# Patient Record
Sex: FEMALE | Race: WHITE
[De-identification: names, ages, dates, MRNs, and addresses within clinical notes are randomized per-mention and may not be internally consistent; named-entity substitution may affect disease eponyms.]

---

## 2018-02-13 ENCOUNTER — HOSPITAL ENCOUNTER (OUTPATIENT)
Dept: HOSPITAL 95 - LAB | Age: 63
End: 2018-02-13
Payer: MEDICARE

## 2018-02-13 DIAGNOSIS — F19.10: Primary | ICD-10-CM

## 2018-02-13 PROCEDURE — G0480 DRUG TEST DEF 1-7 CLASSES: HCPCS

## 2018-02-14 LAB
Lab: 16 NG/ML
NORBUPRENORPHINE UR QL CFM: 5 NG/ML

## 2018-03-02 ENCOUNTER — HOSPITAL ENCOUNTER (OUTPATIENT)
Dept: HOSPITAL 95 - ER | Age: 63
Setting detail: OBSERVATION
LOS: 3 days | Discharge: HOME | End: 2018-03-05
Attending: HOSPITALIST | Admitting: HOSPITALIST
Payer: MEDICARE

## 2018-03-02 VITALS — HEIGHT: 65.98 IN | WEIGHT: 151.9 LBS | BODY MASS INDEX: 24.41 KG/M2

## 2018-03-02 DIAGNOSIS — R40.0: ICD-10-CM

## 2018-03-02 DIAGNOSIS — F19.11: ICD-10-CM

## 2018-03-02 DIAGNOSIS — I48.92: ICD-10-CM

## 2018-03-02 DIAGNOSIS — G93.89: ICD-10-CM

## 2018-03-02 DIAGNOSIS — F32.9: ICD-10-CM

## 2018-03-02 DIAGNOSIS — Z87.891: ICD-10-CM

## 2018-03-02 DIAGNOSIS — G40.909: ICD-10-CM

## 2018-03-02 DIAGNOSIS — G62.9: ICD-10-CM

## 2018-03-02 DIAGNOSIS — I48.91: Primary | ICD-10-CM

## 2018-03-02 DIAGNOSIS — B19.20: ICD-10-CM

## 2018-03-02 DIAGNOSIS — F41.9: ICD-10-CM

## 2018-03-02 DIAGNOSIS — Z90.710: ICD-10-CM

## 2018-03-02 DIAGNOSIS — Z98.890: ICD-10-CM

## 2018-03-02 DIAGNOSIS — Z79.899: ICD-10-CM

## 2018-03-02 DIAGNOSIS — Z90.89: ICD-10-CM

## 2018-03-02 DIAGNOSIS — J44.9: ICD-10-CM

## 2018-03-02 LAB
ALBUMIN SERPL BCP-MCNC: 3.4 G/DL (ref 3.4–5)
ALBUMIN/GLOB SERPL: 0.8 {RATIO} (ref 0.8–1.8)
ALT SERPL W P-5'-P-CCNC: 40 U/L (ref 12–78)
ANION GAP SERPL CALCULATED.4IONS-SCNC: 6 MMOL/L (ref 6–16)
AST SERPL W P-5'-P-CCNC: 24 U/L (ref 12–37)
BASOPHILS # BLD AUTO: 0.04 K/MM3 (ref 0–0.23)
BASOPHILS NFR BLD AUTO: 1 % (ref 0–2)
BILIRUB SERPL-MCNC: 0.1 MG/DL (ref 0.1–1)
BUN SERPL-MCNC: 21 MG/DL (ref 8–24)
CALCIUM SERPL-MCNC: 8.8 MG/DL (ref 8.5–10.1)
CANNABINOIDS UR QL: DETECTED
CHLORIDE SERPL-SCNC: 105 MMOL/L (ref 98–108)
CO2 SERPL-SCNC: 28 MMOL/L (ref 21–32)
CREAT SERPL-MCNC: 0.62 MG/DL (ref 0.4–1)
DEPRECATED RDW RBC AUTO: 42.2 FL (ref 35.1–46.3)
EOSINOPHIL # BLD AUTO: 0.14 K/MM3 (ref 0–0.68)
EOSINOPHIL NFR BLD AUTO: 2 % (ref 0–6)
ERYTHROCYTE [DISTWIDTH] IN BLOOD BY AUTOMATED COUNT: 13.6 % (ref 11.7–14.2)
GLOBULIN SER CALC-MCNC: 4.5 G/DL (ref 2.2–4)
GLUCOSE SERPL-MCNC: 93 MG/DL (ref 70–99)
HCT VFR BLD AUTO: 45.3 % (ref 33–51)
HGB BLD-MCNC: 14.8 G/DL (ref 11.5–16)
IMM GRANULOCYTES # BLD AUTO: 0.03 K/MM3 (ref 0–0.1)
IMM GRANULOCYTES NFR BLD AUTO: 0 % (ref 0–1)
LYMPHOCYTES # BLD AUTO: 2.17 K/MM3 (ref 0.84–5.2)
LYMPHOCYTES NFR BLD AUTO: 26 % (ref 21–46)
MAGNESIUM SERPL-MCNC: 2.3 MG/DL (ref 1.6–2.4)
MCHC RBC AUTO-ENTMCNC: 32.7 G/DL (ref 31.5–36.5)
MCV RBC AUTO: 85 FL (ref 80–100)
MONOCYTES # BLD AUTO: 0.74 K/MM3 (ref 0.16–1.47)
MONOCYTES NFR BLD AUTO: 9 % (ref 4–13)
NEUTROPHILS # BLD AUTO: 5.16 K/MM3 (ref 1.96–9.15)
NEUTROPHILS NFR BLD AUTO: 62 % (ref 41–73)
NRBC # BLD AUTO: 0 K/MM3 (ref 0–0.02)
NRBC BLD AUTO-RTO: 0 /100 WBC (ref 0–0.2)
PLATELET # BLD AUTO: 333 K/MM3 (ref 150–400)
POTASSIUM SERPL-SCNC: 4.4 MMOL/L (ref 3.5–5.5)
PROT SERPL-MCNC: 7.9 G/DL (ref 6.4–8.2)
SODIUM SERPL-SCNC: 139 MMOL/L (ref 136–145)
TROPONIN I SERPL-MCNC: <0.015 NG/ML (ref 0–0.04)
TSH SERPL DL<=0.005 MIU/L-ACNC: 2.07 UIU/ML (ref 0.36–4.8)

## 2018-03-02 PROCEDURE — G0378 HOSPITAL OBSERVATION PER HR: HCPCS

## 2018-03-03 LAB
ALBUMIN SERPL BCP-MCNC: 3.1 G/DL (ref 3.4–5)
ALBUMIN/GLOB SERPL: 0.8 {RATIO} (ref 0.8–1.8)
ALT SERPL W P-5'-P-CCNC: 36 U/L (ref 12–78)
ANION GAP SERPL CALCULATED.4IONS-SCNC: 6 MMOL/L (ref 6–16)
AST SERPL W P-5'-P-CCNC: 21 U/L (ref 12–37)
BASOPHILS # BLD AUTO: 0.04 K/MM3 (ref 0–0.23)
BASOPHILS NFR BLD AUTO: 1 % (ref 0–2)
BILIRUB SERPL-MCNC: 0.2 MG/DL (ref 0.1–1)
BUN SERPL-MCNC: 26 MG/DL (ref 8–24)
CALCIUM SERPL-MCNC: 8.3 MG/DL (ref 8.5–10.1)
CHLORIDE SERPL-SCNC: 105 MMOL/L (ref 98–108)
CHOLEST SERPL-MCNC: 236 MG/DL (ref 50–200)
CHOLEST/HDLC SERPL: 3.7 {RATIO}
CO2 SERPL-SCNC: 29 MMOL/L (ref 21–32)
CREAT SERPL-MCNC: 0.64 MG/DL (ref 0.4–1)
DEPRECATED RDW RBC AUTO: 43.1 FL (ref 35.1–46.3)
EOSINOPHIL # BLD AUTO: 0.12 K/MM3 (ref 0–0.68)
EOSINOPHIL NFR BLD AUTO: 2 % (ref 0–6)
ERYTHROCYTE [DISTWIDTH] IN BLOOD BY AUTOMATED COUNT: 13.6 % (ref 11.7–14.2)
GLOBULIN SER CALC-MCNC: 4 G/DL (ref 2.2–4)
GLUCOSE SERPL-MCNC: 97 MG/DL (ref 70–99)
HCT VFR BLD AUTO: 39.7 % (ref 33–51)
HDLC SERPL-MCNC: 64 MG/DL (ref 39–?)
HGB BLD-MCNC: 12.8 G/DL (ref 11.5–16)
IMM GRANULOCYTES # BLD AUTO: 0.03 K/MM3 (ref 0–0.1)
IMM GRANULOCYTES NFR BLD AUTO: 0 % (ref 0–1)
LDLC SERPL CALC-MCNC: 129 MG/DL (ref 0–110)
LDLC/HDLC SERPL: 2 {RATIO}
LYMPHOCYTES # BLD AUTO: 2.24 K/MM3 (ref 0.84–5.2)
LYMPHOCYTES NFR BLD AUTO: 33 % (ref 21–46)
MCHC RBC AUTO-ENTMCNC: 32.2 G/DL (ref 31.5–36.5)
MCV RBC AUTO: 86 FL (ref 80–100)
MONOCYTES # BLD AUTO: 0.61 K/MM3 (ref 0.16–1.47)
MONOCYTES NFR BLD AUTO: 9 % (ref 4–13)
NEUTROPHILS # BLD AUTO: 3.8 K/MM3 (ref 1.96–9.15)
NEUTROPHILS NFR BLD AUTO: 56 % (ref 41–73)
NRBC # BLD AUTO: 0 K/MM3 (ref 0–0.02)
NRBC BLD AUTO-RTO: 0 /100 WBC (ref 0–0.2)
PLATELET # BLD AUTO: 278 K/MM3 (ref 150–400)
POTASSIUM SERPL-SCNC: 4.8 MMOL/L (ref 3.5–5.5)
PROT SERPL-MCNC: 7.1 G/DL (ref 6.4–8.2)
SODIUM SERPL-SCNC: 140 MMOL/L (ref 136–145)
TRIGL SERPL-MCNC: 217 MG/DL (ref 30–160)
VLDLC SERPL CALC-MCNC: 43 MG/DL (ref 6–32)

## 2018-05-03 ENCOUNTER — HOSPITAL ENCOUNTER (OUTPATIENT)
Dept: HOSPITAL 95 - LAB | Age: 63
Discharge: HOME | End: 2018-05-03
Attending: PSYCHIATRY & NEUROLOGY
Payer: MEDICARE

## 2018-05-03 DIAGNOSIS — Z79.899: ICD-10-CM

## 2018-05-03 DIAGNOSIS — Z51.81: Primary | ICD-10-CM

## 2018-05-03 PROCEDURE — G0480 DRUG TEST DEF 1-7 CLASSES: HCPCS

## 2019-01-17 ENCOUNTER — HOSPITAL ENCOUNTER (EMERGENCY)
Dept: HOSPITAL 95 - ER | Age: 64
Discharge: HOME | End: 2019-01-17
Payer: MEDICARE

## 2019-01-17 VITALS — BODY MASS INDEX: 30.73 KG/M2 | HEIGHT: 64 IN | WEIGHT: 180.01 LBS

## 2019-01-17 DIAGNOSIS — F32.9: Primary | ICD-10-CM

## 2019-01-17 DIAGNOSIS — F41.9: ICD-10-CM

## 2019-01-17 DIAGNOSIS — F43.9: ICD-10-CM

## 2019-01-17 DIAGNOSIS — Z72.0: ICD-10-CM

## 2019-01-17 LAB
ALBUMIN SERPL BCP-MCNC: 3.6 G/DL (ref 3.4–5)
ALBUMIN/GLOB SERPL: 0.9 {RATIO} (ref 0.8–1.8)
ALT SERPL W P-5'-P-CCNC: 35 U/L (ref 12–78)
ANION GAP SERPL CALCULATED.4IONS-SCNC: 6 MMOL/L (ref 6–16)
APAP SERPL-MCNC: <2 UG/ML (ref 10–30)
AST SERPL W P-5'-P-CCNC: 24 U/L (ref 12–37)
BASOPHILS # BLD AUTO: 0.03 K/MM3 (ref 0–0.23)
BASOPHILS NFR BLD AUTO: 0 % (ref 0–2)
BILIRUB SERPL-MCNC: 0.3 MG/DL (ref 0.1–1)
BUN SERPL-MCNC: 19 MG/DL (ref 8–24)
CALCIUM SERPL-MCNC: 8.2 MG/DL (ref 8.5–10.1)
CANNABINOIDS UR QL: DETECTED
CHLORIDE SERPL-SCNC: 108 MMOL/L (ref 98–108)
CO2 SERPL-SCNC: 26 MMOL/L (ref 21–32)
CREAT SERPL-MCNC: 0.57 MG/DL (ref 0.4–1)
DEPRECATED RDW RBC AUTO: 42.3 FL (ref 35.1–46.3)
EOSINOPHIL # BLD AUTO: 0.11 K/MM3 (ref 0–0.68)
EOSINOPHIL NFR BLD AUTO: 2 % (ref 0–6)
ERYTHROCYTE [DISTWIDTH] IN BLOOD BY AUTOMATED COUNT: 13 % (ref 11.7–14.2)
ETHANOL SERPL-MCNC: <3 MG/DL
GLOBULIN SER CALC-MCNC: 3.8 G/DL (ref 2.2–4)
GLUCOSE SERPL-MCNC: 80 MG/DL (ref 70–99)
HCT VFR BLD AUTO: 41.3 % (ref 33–51)
HGB BLD-MCNC: 13.6 G/DL (ref 11.5–16)
IMM GRANULOCYTES # BLD AUTO: 0.03 K/MM3 (ref 0–0.1)
IMM GRANULOCYTES NFR BLD AUTO: 0 % (ref 0–1)
LEUKOCYTE ESTERASE UR QL STRIP: (no result)
LYMPHOCYTES # BLD AUTO: 2.04 K/MM3 (ref 0.84–5.2)
LYMPHOCYTES NFR BLD AUTO: 28 % (ref 21–46)
MCHC RBC AUTO-ENTMCNC: 32.9 G/DL (ref 31.5–36.5)
MCV RBC AUTO: 88 FL (ref 80–100)
MONOCYTES # BLD AUTO: 0.61 K/MM3 (ref 0.16–1.47)
MONOCYTES NFR BLD AUTO: 8 % (ref 4–13)
NEUTROPHILS # BLD AUTO: 4.52 K/MM3 (ref 1.96–9.15)
NEUTROPHILS NFR BLD AUTO: 62 % (ref 41–73)
NRBC # BLD AUTO: 0 K/MM3 (ref 0–0.02)
NRBC BLD AUTO-RTO: 0 /100 WBC (ref 0–0.2)
PLATELET # BLD AUTO: 284 K/MM3 (ref 150–400)
POTASSIUM SERPL-SCNC: 4.2 MMOL/L (ref 3.5–5.5)
PROT SERPL-MCNC: 7.4 G/DL (ref 6.4–8.2)
SALICYLATES SERPL-MCNC: 3.5 MG/DL (ref 2.8–20)
SODIUM SERPL-SCNC: 140 MMOL/L (ref 136–145)
SP GR SPEC: 1.02 (ref 1–1.02)
TSH SERPL DL<=0.005 MIU/L-ACNC: 1.41 UIU/ML (ref 0.36–4.8)
UROBILINOGEN UR STRIP-MCNC: (no result) MG/DL
WBC #/AREA URNS HPF: (no result) /HPF (ref 0–5)

## 2019-01-17 PROCEDURE — G0480 DRUG TEST DEF 1-7 CLASSES: HCPCS

## 2019-07-02 ENCOUNTER — HOSPITAL ENCOUNTER (EMERGENCY)
Dept: HOSPITAL 95 - ER | Age: 64
Discharge: LEFT BEFORE DISCHARGE | End: 2019-07-02
Payer: MEDICARE

## 2019-07-02 DIAGNOSIS — Z53.21: Primary | ICD-10-CM

## 2021-07-30 NOTE — NUR
ASSUMED CARE OF PATIENT AT APPROXIMATELY 0042 FROM ED RN. PATIENT ARRIVED TO
UNIT SLOUCHED OVER IN ED BED; HEPARIN GTT LINE NOT CONNECTED AND CARDIZEM GTT
AT 15MG/HR.  PATIENT COMPLAINING OF CHEST PAIN IN CHEST THAT SHE DESCRIBES AT
"[facility]"; PATIENT STATES NOTHING GIVEN IN ER HELPED; DENIES THAT
NITRO HELPED BUT WILL TAKE FENTANYL.  PATIENT HAVING HARD TIME GETTING
COMFORTABLE; SITTING UP AND LAYING BACK; REPORTS BED HURTS BACK; BREAHING
LABORED AT TIMES.  PG STATED BY SCAR LIVINGSTON, CHARGE RN.  PATIENT REPORTS USING
HEROIN 4/5 TIMES A DAY EVERY DAY; METH AND MARIJUANA.  ADMISSION COMPLETE.
AFIB W/ RATE IN 70-90'S; CARDIZEM GTT AT 15ML HELD AT THIS TIME DUE TO SBP
90-82 SINCE ARRIVAL.  HEPARIN GTT STARTED PER ORDER.

## 2021-07-31 NOTE — NUR
CALLED DR. REES REGARDING PATIENT CALLING OUT WITH 10/10 CHEST PAIN
REQUESTING FENTANYL; STATES NITRO DOESNT HELP; 2LPM VIA NC FOR CP;
DIAPHORETIC; SBP IN 90'S; ORDERS FOR 250CC BOLUS AND CALL IF BOLUS DOES NOT
IMPROVE BP.

## 2021-07-31 NOTE — NUR
CALLED DR. REES REGARDING PATIENT CALLING OUT STATING CHEST PAIN; BLOOD
PRESSURE HAD COME UP  SBP BUT BACK DOWN TO 97/58 AFTER BOLUS; ORDERS FOR
ANOTHER 250ML BOLUS NOW

## 2021-07-31 NOTE — NUR
PT SUMMARY:
PT POST ANGIO THIS AM CORONARY ARTERIES WERE CLEAR NO INTERVENTION DONE, RIGHT
RADIAL ACCESS SITE WITH TEGADERM CDI NO HEMATOMA NOTED AROUND THE SITE. VITALS
HRR REMAINED AFIB CONTROLLED ON THE 90'S CARDIZEM GTT OFF PT WAS STARTED ON
ORAL METOPROLOL. BP SYSTOLIC ON 'S, SATS ABOVE 97% ON RA, AFEBRILE.
KEEPS C/O CHEST AND ABD PAIN, NAUSEA AND DRY HEAVING SHOWING SYMPTOMS OF
HEROIN WITHDRAWAL PT STATED SHE WANTS TO GO HOME AND THAT SHE NEEDS HER
HEROINE SHOT, ZOFRAN AND MAALOX ORDERED PER PHARMACY POLICY PT CANNOT START
ANY SUBOXONE AND METHADONE HERE SINCE PT IS NOT ON THERAPY OR REHAB AS PT WAS
REQUESTING FOR ONE FOR THE WITHDRAWAL, PT UNDERSTOOD WHEN EXPLAINED. CONTINUES
TO SCREAM AND YELL FOR PAIN. PT WAS STARTED ON VANCO AS WELL FOR SEPSIS,
CONTINUES ON HEPARIN GTT FOR PE. SBA FOR TRANSFERS. PT ABLE TO MAKE NEEDS
KNOWN, ANXIOUS MOST OF THE TIME. DR REID AWARE OF PT'S SITUATION. NO APPETITE
AT THIS TIME REFUSED HER DINNER. PT ALSO C/O PINS AND NEEDLES ON BOTH LEGS,
BENGAY ORDERED BUT WAS NOT THAT EFFECTIVE. PT GOT POWERGLIDE PULLED AND ONE IV
LINE, IMPULSIVE AT TIMES, BED ALARM ON FOR SAFETY, MULTIPLE ATTEMPTS OF
GETTING OUT OF BED WITHOUT CALLING FOR HELP. PT ABLE TO MAKE NEEDS KNOWN, CALL
LIGHTS WITHIN REACH WILL REPORT TO ONCOMING SHIFT.

## 2021-07-31 NOTE — NUR
DR. CUELLAR WAS NOTIFIED THAT PT'S RHYTHM HAD CHANGED AND THAT THERE'S ELEVATIONS
GLOBALLY ON THE EKG AND THAT PT IS HAVING CRUSHING BACK PAIN DIRECTLY BEHIND
HER HEART. WAS MADE AWARE OF HER ELEVATED TROP. DR. CUELLAR STATES THAT THE PT IS
HAVING REPOLARIZATION ISSUES OR PERICARDITIS AND THAT THIS IS NOT A STEMI.

## 2021-07-31 NOTE — NUR
PT WANTING TO LEAVE DUE TO SEVERE PAIN THAT ORDERED MEDS WERE NOT HELPING.
PT STS SHE HAS NEUROPATHY AND SELF MEDICATES. DR RITCHIE WAS CALLED. PT PULLED
ALL LINES AND CORDS OFF. HEPARIN WAS PUT ON STANDBY UNTIL NEW IV WAS STARTED.

## 2021-07-31 NOTE — NUR
FENTANYL HELPED PAIN; PATIENT ATTEMPTED TO GET UP INDEPENDENTLY TO URINATE;
HAS BEEN INCONTINENT OF URINE; REPORTS CHEST PAIN STILL BUT THEN REPORTS SHE
WOULD LIKE STRAWBERRY YOUGURT FOR BREAKFAST.  BLOOD PRESSURE STABLE; NO OTHER
ACUTE CHANGES TO REPORT.

## 2021-08-01 NOTE — NUR
SHIFT SUMMARY
PT IS ALERT AND ORIENTED. PT HAS HAD SOME AGITATION DUT TO NOT RECIEVING THE
PAIN MANAGEMENT THAT SHE NEEDED. SHE REMOVED IV LINES AND CORDS AND STARTED
GETTING DRESSED TO GO HOME "WHERE SHE COULD GET HER MEDICATION." THE DR WAS
CALLED AND GAVE A NEW ORDER FOR PAIN MANAGEMENT THAT HAS SO FAR BEEN WORKING.
PT DOES GET OUT OF BED AND SET OFF THE ALARM OCCASSIONALY. PT REPORTS CHEST
PAIN AND LEG PAIN 10/10. PT HAS A BRIEF ON DUE TO SOME INCONTINANCE. VITALS
ARE STABLE AND IS ON ROOM AIR WITH SATS ABOVE 90%. CALL LIGHT IS WITHIN REACH.

## 2021-08-01 NOTE — NUR
TRASNFER:
 
NO ACUTE CHANGES. CONTINUED PAIN MANAGEMENT. HEPARIN INFUSING. PT TRANSFERRED
TO MEDICAL. REPORTED OFF TO MEDICAL FLOOR NURSE. PT TRANFERRED UP TO MEDICAL
FLOOR VIA WHEELCHAIR WITH ALL PERSONAL BELONGINGS.

## 2021-08-01 NOTE — NUR
PT COMPLAINING OF NEUROPATHY PAIN, CALL PLACED TO PHARMACY AND DR. REID TO
ORDER PATIENTS LYRICA. NEW ORDER FOR LYRICA BID. WILL CONTINUE TO MONITOR.

## 2021-08-01 NOTE — NUR
SHIFT SUMMARY
PT IS ALERT BUT VERY DROWSY. NEVER USE THE CALL LIGHT EVEN IF INSTRUCTED. PT
VERY IMPULSIVE AND UNSTEADY ON HER FEET. PT RECEIVING PAIN MEDICATION PER EMAR
FOR PAIN. PT C/O LEEG PAIN AND STATED THAT FENTANYL MAKES HER NEUROPATHY WORSE
AND NOT HELPING. PT RECEIVING HEPARIN DRIP FOR PULMUNARY EMBOLI. PT STATED
NOTHING REALLY HELPS HER LEGS. VERY HIGH FALL RISK AND WOULD BENEFIT TO BE IN
SPECIAL CARE UNIT. BED ALARM IS ON AND CALL LIGHT WITHIN REACH.

## 2021-08-01 NOTE — NUR
PT FROM ICU TO MEDICAL FLOOR. PT IS 65YO/F WHO WAS ADMITTED FOR CP AND RAPID
AFIB. PT WAS DIAGNOSED TO HAVE PERICARDITIS, AND SHE HAD AN ANGIO- R RADIAL
SITE INTACT. PT ALSO DIAGNOSED TO HAVE P.E OF THE R LOWER LOBE. PT TAKING
INDOMETHACIN AND IS ON HEPARIN DRIP. PT STATED SHE HAS PAIN EVERYWHERE. PT
APPARENTLY USE DRUGS "HEROIN/METH". NO VEGETATIONS FOUND PER ICU RN. PT VERY
UNSTEADY AND IMPULSIVE, SO BED ALARM IS ON. MEDICATED FOR PAIN PER EMAR. PT IS
C/O OF BLE NEUROPATHY; PT NO TELE. THE ICU RN STATED THAT THIS PT TRIED TO
LEAVE AMA LAST NIGHT AND TRIED TO PULL HER IV. PT HAS POWERGLIDE ON MARIANELA, AND
RIGHT IV. PT HAS MULTIPLE HX OF COMORBIDITIES SUCH AS COPD, NEUROPATHY,
ANXIETY, DEPRESSION, METH USE, SMOKER, AND HEP C. BED ALARM IS ON AND CALL
LIGHT WITHIN REACH

## 2021-08-01 NOTE — NUR
NADRE
AT 1512 TODAY, PT RECEIVED 50 MCG OF FENTANYL- WITNESSED BY ISAURA PALACIOS
AND WASTED THE 50 MCG.  THIS NURSE THOUGHT THAT THE PYXIS WAS ASKING HOW MANY
VIAL INSTEAD OF HOW MUCH DOSE WILL BE GIVEN. PYXIS IS NOW SHOWING THAT THIS RN
GAVE 1 MCG AND WASTED 99MCG IN A 100 MCG/VIAL. CHARGE RN AWARE- GUILHERME HIDALGO AND ALSO CALLED NURSING SUPERVISOR. CALLED THE PHARMACY, BUT UNABLE
TO HELP RESOLVE THE ISSUE

## 2021-08-01 NOTE — NUR
PT ALERT AND ORIENTED X4. SLEEPY BUT MOAINING OUT IN PAIN THIS AM. MEDICATED
PER EMAR AS WELL AS REPOSITIONED AND DISTRACTION. PATIENT STATES HER PAIN IS
"EVERYWHERE". HER LEGS TWITCHED DURING MY ASSESSMENT AND SHE ATTRIBUTES THIS
TO HER RESTLESS LEG SYNDROME. ON ROOM AIR, LUNGS SOUNDING CLEAR. TELE SHOWING
AFIB RATE CONTROLLED. DENIES CHEST PRESSURE BUT STATES SHE DOES HAVE SOME
CHEST PAIN AS HER PAIN IS EVERYWHERE. VITAL SIGNS STABLE. BOWEL TONES PRESENT.
UP TO BSC WITH 1 PERSON ASSIST. BED ALARM ON FOR SAFETY. HEPARIN INFUSING.
ANTIBIOTICS INFUSED. WILL CONTINUE TO MONITOR. CALL LIGHT IN REACH.

## 2021-08-02 NOTE — NUR
SUMMARY- PT A/O TO SELF AND PLACE, FOLLOWS COMMANDS. UP TO BEDSIDE COMMODE
FREQ TO VOID, HAD MULT HARD OLVIN EARLY IN THE DAY AND COMPLAINED OF
CONSTIPATION. MD ORDERED LAXATIVE AND PT HAD XLG FORMED STOOL, MOVED INTO
LOOSE INCONT STOOL IN PM. PT SLEPT  MAJORITY OF THE DAY. WHEN AWAKE FREQ
RESTLESS AND PAINFUL. C/O LE LEG PAIN, ABD CRAMPING AND CHEST PAIN WITH
BREATHING. MEDICATED Q 2-3 HOURS FENT FOR PAIN AND HEROIN ADDICTION. DR CHANEL IN TO EVAL PT ABOUT 1500. PLAN TO LOOK AT BLOOD CX AND PERFORM DESHAUN IF
BACTERIAL LEVELS NOT RESPONDING TO ABX. PT HAD A ASTHMA ATTACK ABOUT 1415,
CALLD DR REID AND ALBUTEROL NEB ORDERED, HELPFUL TO RELEIVE SOB AND WHEEZE.
PT'S BP ALSO ELEVATED, JEANETTE ADJUSTED DOSES. TOOK FREQ VITALS RELATED TO HIGH
VEWS, BP AND RESPONDED BY 1830, CONT TACHYCARDIA. WILL REPORT TO DUYEN BOJORQUEZ.

## 2021-08-02 NOTE — NUR
PT WANTING TO LEAVE AMA: PT HAD FINALLY FALLEN ASLEEP AFTER RECIEVING ATIVAN
PO X1 BUT AWOKE IRRATIONAL AND DETERMINED TO LEAVE. SHE WAS FOUND PACKING HER
BELONGINGS AFTER DC'ING HER OWN IV AND PG AT 0545. BOTH LINES WERE OBSERVED TO
BE FULLY INTACT AND DISGUARDED. THIS RN SPENT MUCH TIME W/HER ATTEMPTING TO
CONVINCE HER TO STAY, PROVIDING SUPPORT AND LISTENING TO CONCERNS. SHE
REPEATEDLY ASKED FOR AMA PAPERWORK DESPITE ATTEMPTS TO CALM AND EXPLAIN
BENEFITS VS RISKS OF STAYING/LEAVING. PT STATED "I DON'T CARE IF I DIE AND I
HAVE NOTHING TO LIVE FOR ANYWAYS". SHE PROCEEDED TO PROVIDE STAFF W/DETAILED
HISTORY OF PAST AND FAMILY ISSUES. SHE INSISTED UPON LEAVING DESPITE KNOWLEGE
OF SEPTIC EMBOLISM, NEED FOR ABX, HEPARIN GTT AND RISKS TO TERMINATING MEDICAL
CARE ABRUPTLY.  ALERTED AND SHE INSTRUCTED STAFF TO ATTEMPT TO
ENCOURAGE PT TO STAY FOR 1-2 HOURS FOLLOWING DC'ING HER OWN HEPARIN GTT FOR PT
SAFETY. PT HAD BEEN FIXATED ON LYRICA SO AN ADDITIONAL X1 DOSE WAS RX'D. PT
STILL WANTED TO LEAVE SO WAS MADE AWARE SHE'D NEED TO ORGANIZE A RIDE AND WALK
OUT ON HER OWN. PT ATTEMPTED TO DO SO BUT SUDDENLY REALIZED SHE WAS INCAPABLE
OF WALKING ANY DISTANCE. SHE RETURNED TO HER ROOM, REQUESTED MEDICAL CARE ONCE
AGAIN, ASKED FOR LYRICA AND PAIN MEDS AND FOR NEW IV ACCESS TO BE STARTED FOR
HEPARIN GTT. PT SETTLED INTO BED, LYRICA WAS GIVEN, NEW 20G IV WAS PLACED TO
PT'S L.FA AND PRN FENTANYL WAS RECIEVED. PHARMACY WAS CONSULTED REGARDING
RESTARTING HEPARIN GTT AND MINH (PHARMACIST) INSTRUCTED TO RESTART IT AT THE
PREVIOUS RATE W/O ANY BOLUSES REQUIRED. HEPARIN GTT IS AGAIN INFUSING AT 20.5
UN/KG/HR. PT SETTLED AND SLEEPING W/BED ALARM ON AT THIS TIME.

## 2021-08-02 NOTE — NUR
PT IS CURRENTLY EXPERIENCING VERY HIGH ANXIETY, AGGITATION, FIGITING,
RESTLESSNESS, IMPULSIVITY AND FREQUENT NON-ACUTE REQUESTS/COMPLAINTS. HTN
PERSISTS LIKELY SECONDARY TO THIS PRESENTATION.  MADE AWARE AND
ATIVAN 0.5MG PO X1 RX'D AND RECIEVED. WILL MONITOR FOR EFFECT.

## 2021-08-02 NOTE — NUR
3000 UNIT HEPARIN BOLUS GIVEN AND HEPARIN GTT INCREASED TO 20.5 UN/KG/HR (24.6
ML/HR) PER PHARMACY MANAGEMENT.

## 2021-08-02 NOTE — NUR
SUMMARY: PT A/OX3 BUT HAS CONCRETE THINKING, FLIGHTS OF IDEAS AND IS VERY
EMOTIONAL AT TIMES. SHE REMAINS HIGHLY ANXIOUS/AGGITATED, RESTLESS, AND
IMPULSIVE OOB W/ACCOMPANIED HTN ('S/100'S). STAFF HAVE MADE FREQUENT AND
REPEATED ATTEMPTS TO TEND TO HER VARIOUS NEW COMPLAINTS AND REQUESTS. T/O
NOCTE SHE'S BEEN MEDICATED FOR LEG/"EVERYWHERE" PAIN W/SCHEDULED LYRICA,
FENTANYL IV PRN AND TYLENOL PRN. SHE'S RECIEVED ZOFRAN PRN FOR C/O NAUSEA AND
FEELING "SO SICK". SHE'S REQUESTED SNACKS AND DRINKS TO "SETTLE HER STOMACH"
BUT ALSO SAYS SHE "CAN'T EAT/DRINK BECAUSE OF A SORE THROAT". NO ABNORMALITIES
TO PT'S THROAT OBSERVED. SHE C/O OF HER SKIN BURNING, ARM SPASMS AND CLAIMED
TO HAVE A SEIZURE WHILE STAFF WERE IN ROOM. MOVEMENTS APPEARED VOLUNTARY, SHE
WAS CONSCIOUS AND TALKING TO STAFF T/O THIS VERY BRIEF EPISODE AND SHE CALMED
WITHIN MOMENTS VIA CONVERSATION, BREATHING EXERCISES AND REASSURANCE. EVERY
TIME STAFF ENTER ROOM SHE SEEMS TO HAVE C/O A NEW SYMPTOM. PT HAS DENIED CP
THIS SHIFT BUT STATES FENTANYL "MAKES RESTLESS LEGS WORSE". SHE CLAIMS A
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
HOURS/DAY" AND "ADDERALL WAS RX'D TO COMBAT HER DROWSINESS". SHE THEN STATED
IT WAS DC'D AND SHE "TOOK UP HEROIN INSTEAD". SHE SAYS SHE'S DETOXING AND "THE
WITHDRAWAL WILL LAST WEEKS". SHE'S RESTED FOR BRIEF MOMENTS BUT HAS SPENT THE
MAJORITY OF SHIFT AWAKE W/HIGH ANXIETY.  UPDATED TO ALL OF THE ABOVE
W/ATIVAN 0.5MG PO X1 RX'D AND RECIEVED TO ASSESS EFFECT, SO FAR NO RELIEF
OBSERVED. BED ALARM REMAINS ON FOR FALL RISK. HEPARIN GTT INFUSES AT 20.5
UN/KG/HR (24.6 ML/HR) PER PHARMACY MANAGEMENT FOR SEPTIC EMBOLISM. IV ABX
RECIEVED. WBC'S CONTINUE TO RISE,  AWARE. NO ACUTE CHANGES WCTM AND
REPORT TO DAY RN.

## 2021-08-03 NOTE — NUR
SHIFT SUMMARY
A/OX3, INCREASINGLY ANXIOUS T/O SHIFT. UNSTEADY GAIT AND IMPULSIVE. SBA TO BSC
WITH BED ALARM ON. C/O GENERALIZED PAIN D/T WITHDRAWLS, MEDICATED PER EMAR. PT
GIVEN SHOWER THIS SHIFT WITH SOME RELIEF. SEVERAL LOOSE STOOLS AND FREQUENT
URINATION NOTED. BED IN LOWEST POSITION WITH CALL LIGHT IN REACH. WILL
CONTINUE TO MONITOR AND REPORT TO ONCOMING RN.

## 2021-08-03 NOTE — NUR
SUMMARY- PT ALERT TO SELF AND CIRCUMSTANCES. PERIODS THAT SHE FORGETS WHERE
SHE IS/ PERIODS OF AGITATION USUALLY WITH TOO MUCH TIME BETWEEN FENT DOSES.
MEDICATED WITH FENTANYL APPROX Q2 FOR GEN BODY ACHE INCLUDING LEGS AND C/O
CHEST DISCOMFORT ESPECIALLY WITH DEEP BREATH. LUNGS ARE CLEAR. BREATHING HAS
BEEN EVEN UNLABORED. PT TOLERATING FOOD AND FLUIDS, HAS BEEN HAVING DIARRHEA.
SENT STOOL CX. INFORMATION GIVEN TO HELP PT SET UP INPATIENT DRUG TREATMENT.
OFFERED HELP TO SET UP BUT PT WAS SLEEPY OR AGITATED ALL DAY. SISTER JUDI
CAME TO SEE PT AND BRING CLOTHS. HER HOME PHONE [phone].
WILL REPORT TO DUYEN BOJORQUEZ.

## 2021-08-04 NOTE — NUR
SHIFT SUMMARY
A/OX3, ANXIOUS AND AGITATED T/O SHIFT. IMPULSIVE AND ATTEMPTING TO WALK AROUND
DESPITE UNSTEADY GAIT. WHEN ATTEMPTING TO HELP SAFELY AMBULATE PT BEGAN TO
YELL AND THREATEN STAFF. PT PLACED CALL . REFUSING AM LABS, INCLUDING
VANCO TROUGH. PHARMACY NOTIFIED. WILL ATTEMPT TO DRAW LABS AGAIN. CONTINUES TO
BE HYPERTENSIVE. C/O GENERALIZED PAIN, IV FENTANYL GIVEN. BED IN LOWEST
POSITION, ALARM ON, CALL LIGHT IN REACH. WILL CONTINUE TO MONITOR AND REPORT
TO ONCOMING RN.

## 2021-08-04 NOTE — NUR
PT GETTING UP SEVERAL TIMES THIS MORNING TO USE COMMODE.  HAS HAD LOOSE
STOOLS.  BECAME ANGRY WHEN TOLD TO HOLD ON A MINUTE TO GET COMMODE CLOSE TO
HER ONCE SHE WAS FOUND WALKING BACKWARD AND PULLING HER IV PUMP TO FIND
COMMODE.  HAS BEEN REFUSING TO TAKE FENTANYL FOR WITHDRAWALS SAYING IT PUTS
HER TO SLEEP.  KEPT SAYING SHE WOULD BE FINE WITH THE CARE IF SHE COULD GET
METHADONE OR GO HOME AND TAKE A HIT.  AS MORNING MEDS BEING GIVEN PT SAID SHE
COULDN'T STAY HERE ANYMORE SHE HAD TO LEAVE.  DISCUSSED THE BLOOD CULTURE
RESULTS COMING BACK THIS AFTERNOON AND BEING PLACED ON CORRECT ANTIBIOTICS TO
TREAT HER SEVERE INFECTION WHICH COULD POTENTIALLY KILL HER.  SHE SAID SHE
DIDN'T CARE IF SHE  SHE JUST NEEDS TO GET HER FIX.  PULLED HER IV OUT.
GOT THE PHONE BOOK AND CALLED A CAB TO COME PICK HER UP.  DOESN'T HAVE A LOT
OF STAMINA TO MOVE ABOUT IN ROOM LONG PERIODS AND GETS WINDED.  CALLED MD AND
NOTIFIED HER OF PT LEAVING AMA.  MD CAME TO ROOM AND ATTEMPTED TO DISCUSS
RISKS OF LEAVING TOO SOON AND PT SAID SHE WAS LEAVING.  MD ORDERED ANTIBIOTIC
FOR PT TO LEAVE WITH.  CALLED IN TO [facility] PER PT REQUEST.  ASKED PT TO
WAIT TIL MED CALLED IN AND SHE SAID NO AND WALKED DOWN THE BLAS TO DOOR.
WAITED AT LOCKED DOOR AND THEN SAT ON THE FLOOR.  WHEN SOMEONE WENT THROUGH
THE DOOR ANOTHER STAFF MEMBER TRIED CLOSING THE DOOR BUT PT SHIMMIED QUICKLY
THROUGH THE DOOR CHOOSING TO NOT WAIT FOR PERSCRIPTION.  STARTED TO CRAWL DOWN
HALLWAY.  WHEELCHAIR OFFERED AND PT GOT UP AND GOT IN.  SCRIPT HANDED TO HER
AFTER BEING CALLED IN TO [facility].  CHARGE RN WHEELED PT TO [place] WITH A STOP
TO FOR PT TO CALL AND LEAVE HER SISTER A MESSAGE.  LEFT SITTING ON BENCH
OUTSIDE OF PT ENTRANCE WITH SECURITY NOTIFIED.

## 2021-08-05 NOTE — NUR
PATIENT IS CURRENTLY INCONTINENT, UNABLE TO GIVE URINE SAMPLE THAT IS ORDERED,
WILL REASSESS THROUGHOUT SHIFT.

## 2021-08-05 NOTE — NUR
PATIENT BECAME EXTREMELY ANXIOUS WITH MD IN ROOM, PULLED OUT IV IN RIGHT
HAND, DIFFICULT TO CONSOLE, CHANGED BRIEF DID VITALS AND PATIENT O2 SATS 71%
RA, ON 5L NC 90-92%, C/O 6/10 FEELS LIKE KICKED IN THE RIBS, RIGHT LEG
DEPENDENT SWELLING, FOOT DUSKY WITH COOL TO TOUCH REPORTS NUMBNESS. RECEIVED
NEW ORDERS; POLST OXIMETRY MONITORING CONTINUOUS, CHANGE DILAUDID 1-2MG Q4HRS
TO Q2HRS.

## 2021-08-05 NOTE — NUR
PATIENT WOKE UP SCREAMING IN PAIN, PRESSURE IN THE LEFT SIDE OF BACK, UPON
ASSESSMENT NOTED AYSMETICRICAL BREATHING DECREASING OXYGENATION REQUIRING TO
GO FROM 2LNC TO 5LNC, PATIENT FEELS ONLY COMFORTABLE IN TRIPODING POSITION, MD
AT BEDSIDE NEW ORDER RECEIVED PORTABLE CHEST XRAY.

## 2021-08-06 NOTE — NUR
PT'S POTASSIUM HAS BEEN A CONTINUOUS ISSUE T/O THIS SHIFT, DESPITE TREATMENT
LAST NOC AND THIS AM FOR HYPERKALEMIA. PT DID RECIEVE 1L NS BOLUS FOR ELEVATED
LACTIC ACID WHICH HAS NOW FELL TO 2.7, WITH NS INFUSING AT 100ML/HR FOR FLUID
SUPPORT. PT IS A/O X3 ANSWERS MOST QUESTIONS APPROPRIATELY BUT THEN ALSO
BECOMES AGGITATED AND AGGRESSIVE EASILY. THIS AM PT WAS THREATENING TO LEAVE
"BECAUSE OF FUCKING BEEPING AND I JUST WANT TO SLEEP AND I KEEP BEING
BOTHERED" STS SHE IS "SIGNING OUT" PT IS REMINDED THAT SHE BECAME MORE SICK
AFTER SIGNING OUT AMA EARLIER THIS WEEK, PT EUDCATED THAT WITH HER CURRENT
STATUS SHE WILL LIKELY NOT SURVIVE AT HOME IF SHE WERE TO CHECK OUT NOW, PT
AGREES THAT THE BEST COURSE WOULD BE NOT TO CHECK OUT. PT'S PERIPHERAL IV'S
FAILED THIS MORNING AND NEW IV WAS PLACED BY ALEC BOJORQUEZ AND POWERGLIDE PLACED.
PT WITH DISPORPORTIONATE PAIN RELATED TO NEWLY PLACED IV WHICH PT BEGAN
PULLING ON AND SCREAMING AND CURSING, THE IV HAD GOOD DRAW AND FLUSH APPEARS
TO BE PATENT UPON INSPECTION, THE IV WAS REMOVED AS PT WAS ATTEMPTING TO PULL
IT FRO HER ARM, PRESSURE DRESSING APPLIED WHICH PT STS "THIS WRAP IS GOING TO
KILL ME" SHE REMOVES THE WRAP TO DISCOVER THAT THE SITE WILL BLEED WITHOUT
WRAP SHE THEN WAS AGREEABLE TO ALLOW WRAP TO STAY IN PLACE UNTIL BLEEDING
STOPS. ANOTHER NEW IV WAS PLACED IN PT'S RT UPPER CHEST WALL WHICH ALSO
APPEARS PATENT. PT STS THAT THIS RN IS "A WORTHLESS FUCK", FOR DRESSING HER IV
AND STARTING THE NEW IV, PT'S BEHAVIOR IS ADDRESSED AND REMINDED THAT VERBAL
ASSUALTS WILL NOT BE TOLERATED PT STS "WELL I'M SICK". SINCE THAT EVENT PT IS
COOPERATIVE. CURRENTLY D50 INFUSING, NS, AND INSULIN HAS BEEN ADMINISTERED FOR
HYPERKALEMIA ONCE D50 IS FINISHED CA GLUCONATE WILL BE STARTED.

## 2021-08-06 NOTE — NUR
PATIENT STARTED COMPLANING OF MORE BACK PAIN AS EARLIER THIS EVENING PRIOR TO
OBTAINING PORTABLE CHEST XRAY AND DRY HEAVING, RECEIVED NEW ORDER OF ZOFRAN
4MG IVP Q6 PRN.

## 2021-08-06 NOTE — NUR
PATIENT IS MORE COMFORTABLE NOTED GETTING DILAUDID 1MG Q2, LESS ANXIOUS AND
FOLLOWS COMMANDS, STILL REQUIRING 5L NC, AT 0203 RECEIVED CRITICAL K+ 6.1
RECEIVED NEW ORDERS AND IMPLEMENTED, WILL RECHECK CMP AT 0650, 4HRS AFTER
INSULIN, CALCIUM GLUCONATE, AND D50W PUSH. PATIENT HAD INCONTINENT EPISODE,
BED CHANGE, PARITAL BATH AND LINEN CHANGED, CALL LIGHT WITHIN REACH.

## 2021-08-07 NOTE — NUR
PT'S POTASSIUM HAS DECREASED T/O THIS SHIFT WITH THE ADDITION OF NA BICARB.
PT'S LIVER ENZYMES HAVE GREATLY INCREASED TODAY. PT'S pO2 FROM ABG READS AT
50% DESPITE AN SPO2 READING OF 92-93% BY BOTH THIS RN AND RT PIETER T/O THE DAY.
PHOSPHORUS IS SLOWLY DECREASING. PT HAS BEEN ALERT, ORIENTED X3, EASILY
AGITATED, BECOMES VERBALLY AGGRESSIVE WITH PCT TODAY. PT WITH LARGE URINE
OUTPUT TODAY, RENDON REMAINS DRAINING TO GRAVITY. PT HAS BEEN REFUSING FOOD
TRAY THEN DEMANDS ADDITIONAL FOOD. PT PARANOID STS THAT SISTER HAS CALLED
ADMINISTRATION AND BLOCKED HER PHONE USAGE, DESPITE PT USING HER PHONE T/O THE
DAY. PT HAS BEEN PLACED ON 7L O2 VIA NASAL CANNULA TO TITRATE UP OXYGEN.

## 2021-08-07 NOTE — NUR
PATIENT IS ALERT AND ORIENTED X4, ANXIOUS AND AGITATED BUT COOPERATIVE WITH
CARE. COMPLAINS OF GENERALZED PAIN, MEDICATED PER EMAR. PATIENT ON 9L VIA NC
AT BEGINNING OF SHIFT 02 SATS 100%, PT TITRATED DOWN TO 5L 02 SATS 95%.
CONFIRMED MEDICATION ORDER OF BICARB BOLUS WITH PHARMACY. RENDON DRAINING TO
GRAVITY. PATIENT SR-ST @100-120, THEN CONVERTED TO A. -120s. PATIENT IS
CURRENTLY SLEEPING. VSS. CALL LIGHT IN REACH.

## 2021-08-07 NOTE — NUR
ASSUMED CARE
RECEIVED REPORT FROM OSVALDO BOJORQUEZ AND ASSUMED CARE. PT IS RESTING IN BED WITH
EYES CLOSED, NO ACUTE DISTRESS. WILL CONTINUE PLAN OF CARE.

## 2021-08-08 NOTE — NUR
SHIFT SUMMARY
PATIENT IS ALERT AND ORIENTED X4 AGITATED AT TIMES BUT MOSTLY COOPERATIVE. 02
SATS >95% ON 7L VIA NC, PATIENT NEEDS REMINDER TO KEEP NC IN HER NOSE. DENIES
CP/PRESSURE. PATIENT COMPLAINING OF LOWER ABDOMINAL PAIN, AFTER HAVING LARGE
BOWEL MOVEMENT PATIENT STATED SHE FELT MUCH BETTER AND SLEPT MOST THE NIGHT.
INDEPENDENT WITH REPOSITONING. RENDON DRAINING TO GRAVITY. BED ALARM ON AND
CALL LIGHT IN REACH.

## 2021-08-08 NOTE — NUR
PT REPORTED SEVERE BILATERAL FOOT PAIN AND HAD
2 RUNS OF VTACH, (9 AND 7, RESPECTIVELY) WHICH WAS CALLED TO DR. CONWAY
AT 1216 ALONG WITH CA AND PHOS RESULTS, ORDER PROVIDED TO DRAW IONIZED CALCIUM
LEVEL, HEPARIN GTT STEADY AT 27, PTT THERAPEUTIC AT 55, PT HAD CT CHEST (LEFT
1420 VIA WHEELCHAIR WITH RN X2 ACCOMPANYING; PT LEFT ON 7LO2 VIA NC, HEPARIN
GTT THERAPY, AND TELEMETRY MONITORING, AND RETURNED TO ROOM AT 1446, BICARB
DRIP CHANGED TO NS AT 75ML/HR AT 1100, PT ASKED RENO BINGHAM TO DISPOSE OF HER
NICOTINE PATCH RXS IN BOXES FOR HER, PATCHES DISPOSED OF IN BLACK BOX, RN LEFT
MESSAGE FOR DR. CONWAY FOLLOWING UP ABOUT IONIZED CALCIUM RESULTS AT 1456, PT
RECEIVED ACETAM 1X FOR FOOT PAIN, PT DENIES ADDITIONAL CONCERNS AT THIS TIME

## 2021-08-09 NOTE — NUR
SHIFT SUMMARY:
 
NO ACUTE CHANGES. HR TRENDING DOWN 'S. DENIES CHEST PAIN/PRESSURE.
COMPLAINS OF PAIN SCATTERED THROUGHOUT BODY, RELIEVED BY REST AND
REPOSITIONING. VITAL SIGNS REMAIN STABLE. CALL LIGHT IN REACH. PATIENT
SLEEPING AT THIS TIME. WILL REPORT OFF TO ONCOMING NURSE.

## 2021-08-09 NOTE — NUR
PT ALERT AND ORIENTED X4. SLOW TO RESPOND AT TIMES. PUPILS EQUAL, ROUND, AND
REACTIVE. NEURO WNL. ON ROOM AIR SATING ABOVE 94%. DENIES SOB. TELE SHOWING
AFIB WITH -130'S THIS AM. TWO RUNS OF VTACH. DR. CONWAY AWARE,
METOPROLOL INCREASED. CARDIOLOGY ON BOARD. BOWEL TONES PRESENT. PT COMPLAINS
OF CONSTIPATION, BOWEL CARE MEDS ORDERED. COMPLAINS OF PAIN EVERYWHERE.
PATIENT STATES HER PAIN GETS WORSE WHEN SHE IS GOING THROUGH WITHDRAWL. EATING
AND DRINKING WELL. RENDON CATH IN PLACE DRAINING CLEAR/YELLOW URINE TO GRAVITY.
HEPARIN DRIP INFUSING. CALCIUM AND VANCOMYCIN INFUSED THIS AM. CALL LIGHT IN
REACH. PATIENT SLEEPING AT THIS TIME. WILL CONTINUE TO MONITOR.

## 2021-08-09 NOTE — NUR
SHIFT SUMMARY:
PIETER IS A&OX4. BP ELEVATED THIS AM AS WELL AS PT'S LEVEL OF ANXIETY. JULIETA MONTOYA, TOLERATING PO INTAKE WELL, REPORTS SEVERE PAIN IN FEET. SHE DOES STATE
THAT AT HOME SHE SOAKS HER FEET IN HOT WATER AND EPSOM SALTS WHICH HELPS TO
ALLEVIATE THE PAIN AND SWELLING. POWERGLIDE AND PIV PATENT TO RIGHT UPPER ARM.
BED ALARM ON FOR SAFETY AS PT FORGETS TO CALL FOR ASSISTANCE. SHE REPORTS
ADEQUATE PAIN CONTROL WITH 1 MG OF DILAUDID. SHE IS LYING IN BED WITH THE CALL
LIGHT IN REACH. WILL REPORT TO DAY SHIFT RN.

## 2021-08-10 NOTE — NUR
SHIFT SUMMARY
ASSUMED CARE OF PT AT 1900. PT IS A/OX4 WITH TIMES OF CONFUSION. PT DID NOT
KNOW WHERE SHE WAS WHEN SHE WOKE UP AT SHIFT CHANGE. PT C/O BURNING SENSATION
IN HER FEET. HEART SOUNDS IRREGULAR, TELE SHOWS AFIB, LUNG SOUNDS CLEAR. PT
C/O PAIN IN HER MIRIAM AREA DUE TO THE RENDON CATHETER AND BECAME VERY ANXIOUS,
CATHETER WAS TAKEN OUT DUE TO PT DISCOMFORT. PT VOIDS FINE, URINE CLEAR AND
YELLOW. PT C/O NEASEA, MEDICATED PER EMAR WITHOUT SUCCESS, PT AT SNACKS T/O
THE NIGHT, SAYING THAT THEY MAKE HERSTOMACH FEEL BETTER. PT HAS A SMALL SORE
ON THE INSIDE OF HER LIP. PT IS INDEPENDNT TO BSC. PT STATES THAT SHE IS ON
DAY 4 AFTER TAKING HEROIN AND SHE HOPES TO STAY OFF IF IT AND GO TO [facility]
WHEN SHE GETS OUT. PT ASKED FOR NICOTINE GUM TWICE.
 
CALL LIGHT IN REACH, BED IN LOWEST POSTION.

## 2021-08-10 NOTE — NUR
1326 PT TRANSFERED FROM PCU TO . A&OX3, INDEPNENDENT TO BSC.
PT WITH ABD CRAMPING AND NAUSEA
SECONDARY TO D/T'S, PRN ZOFRAN GIVEN WITH GOOD EFFECT. HEPARIN GTT D/C'D,
XARELTO GIMERCYEN AT THAT TIME. NO OTHER CHANGES OR CONCERNS.

## 2021-08-10 NOTE — NUR
PT TRANSFERRED TO  348 REPORT GIVEN TO REGI BOJORQUEZ, PT ACCOMPANIED BY PCT VIA
WHEELCHAIR ALL BELONGINGS SENT WITH THE PT. PT REMAINS ALERT AND ORIENTED GETS
CONFUSED AT TIMES, IRRITABLE, C/O ABD PAIN R/T HEROINE WITHDRAWAL PT TAKES
METHADONE 5MG PT RESTED AFTER AM DOSE. PT CONTINUES ON HEPARIN GTT INCREASED
TO 31U/KG/HR. VITALS HR REMAINED AFIB RATE 'S BETA BLOCKERS Q6HRS. PT
GETS ANXIOUS OFTEN WAS GIVEN FOOD PER REQUESTS. NO OTHER ISSUES ENCOUNTERED.
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
## 2021-08-11 NOTE — NUR
SHIFT SUMMARY:  PT IS ALERT AND ORIENTED.  PT IS ANXIOUS BUT COOPERATIVE WITH
CARE.  PT CALLS APPROPRIATELY.  PT IS INDEPENDENT TO THE BSC.  PT REPORTS
GENERALIZED PAIN, GAVE SCHEDULED METHADONE.  PT DENIES NAUSEA, VOMITING, AND
SOB.  PT SLEPT MUCH OF THE NIGHT WHEN NOT DISTURBED.  NO ACUTE CHANGES OR
COMPLICATIONS THIS SHIFT.  BED IN LOW POSITION, CALL LIGHT WITHIN REACH.  WILL
CONTINUE TO MONITOR AND REPORT TO DAY NURSE.

## 2021-08-12 NOTE — NUR
PT BEEN RESTING QUIETLY WHEN NOT DISTURBED. PT ON TELE. PT BEEN ASSISTED WITH
ADL'S PRN. PT BEEN MED AS ORDERED AND PRN. PT BEEN VOIDING. PT EATING AND
DRINKING WELL. PT BEEN COOPERATIVE THIS AFTERNOON.

## 2021-08-12 NOTE — NUR
SHIFT SUMMARY:
ALERT AND ORIENTED. CONTINUES TO COMPLAINT OF ABDOMINAL PAIN. TOLERATED SNACKS
WELL DURING SHIFT. RESTED WELL OFF AND ON. AGITATED AND WHINY WHEN AWAKE AND
THEN WOULD FALL BACK TO SLEEP WITHOUT DIFFICULTY. MEDICATED WITH TYLENOL FOR
HEADACHE AND GENERALIZED ACHES AND PAIN. A COUPLE OF ANGRY OUTBURSTS WHEN PT
WOULD SET OFF BED ALARM. PT DID REMEMBER TO USE CALL LT LATER IN SHIFT. HR HAS
BEEN AFIB IN ONE TEENS AND LOW ONE HUNDREDS, GETS SCHEDULED METOPROLOL FOR HR
CONTROL. HAD A 6 BEAT RUN OF VTACH LAST NIGHT, ASYMPTOMATIC, DR DURING
DAYSHIFT AWARE. NO ACUTE CHANGES. WILL CONTINUE TO PROVIDE CARE UNTIL SHIFT
REPORT.

## 2021-08-13 NOTE — NUR
SHIFT SUMMARY:
A/O. RESPONDS SLOWLY IN A WHINY VOICE. PERIODS OF AGITATION THEN FALLS ASLEEP.
TOLERATES SNACKS. ON RA. AFIB 1 TEENS. PT PLACED ON HIGHER DOSE OF METOPROLOL
TID. PG TO MARIANELA SALINE LOCKED.  SBA TO BEDSIDE COMMODE. PT RESTED WELL T/O
SHIFT. WILL CONTINUE TO PROVIDE CARE UNTIL SHIFT REPORT.

## 2021-08-13 NOTE — NUR
PT HAS BEEN AGIATED T/O THE DAY, PT EXITS HER ROOM TWICE TO CUSS AND SCREAM AT
STAFF SHE IS "FUCKING HUNGRY" AFTER SHE REFUSES HER FOOD TRAYS. DIETARY HAD
COMPLETED HER MENU WITH HER WHICH SHE WAS AGREEABLE WITH AT THE TIME BUT NOW
SHE IS REFUSING TO EAT FOOD STS THAT SHE WILL ONLY EAT OATMEAL AND SOUP THEN
IS ANGRY WHEN SERVED SOUP AND OATMEAL PER HER REQUESTS. PT STS THAT SHE IS
UNABLE TO EAT "ACTUAL FOOD BECAUSE I AM SO SICK" THEN DEMANDS SANDWICH THAT
WHEN PRESENTED WITH SANDWICH ALSO WILL NOT EAT SANDWICH. PT WITH FLIGHT OF
IDEAS, VERY COMPULSIVE. OTHERWISE VSS.

## 2021-08-13 NOTE — NUR
REPORT GIVEN TO HARSHA BOJORQUEZ. PATIENT IS RESTING AT THIS TIME. SHE WAS ALERT AND
ORIENTED, ABLE TO MAKE HER NEEDS KNOWN. SHE HAS BEEN PLEASANT AND COOPERATIVE
WITH CARE SO FAR THIS SHIFT.
BED LOW AND LOCKED, CALL LIGHT WITHIN REACH.

## 2021-08-14 NOTE — NUR
SHIFT SUMMARY
 
PT IS AO. PT MEDICATED FOR PAIN X3 THIS SHIFT AND X2 FOR FEVER. PT DENIES N/V,
SOB. PT C/O ABD DISCOMFORT/PAIN. PT IS ONE ASSIST TO BCC/BATHROOM. PT TELE
RUNNING AFIB 120-130S WITH ONE INCIDENCE OF 150S THIS AM PRIOR TO AM
MEDICATIONS. PT APPETITE IS POOR TO MODERATE. NO PROCEDURES DONE THIS SHIFT.
PLAN IS FOR WBC TO DECREASE, THEN DC HOME. PT HAS NOT HAD VISITORS THIS SHIFT.
PT IS IN BED, CALL LIGHT IN REACH, BED IN LOW POSITION.

## 2021-08-14 NOTE — NUR
SHIFT SUMMARY
PT IS A 65 Y/O FEMALE, ADMITTED FOR MRSA BACTERMIA AND PERICARDITIS. SHE IS
A&O X 3, VERY ANXIOUS AND DRAMATIC AT TIMES. CAN BE IRRITABLE WITH STAFF AT
TIMES, BUT COOPERATIVE THIS SHIFT. PT OFTEN MOANS WHILE AWAKE, REPEATING THAT
"I'M SICK, I'M VERY SICK". SHE WAS MEDICATED FOR GI UPSET WITH PRN MAALOX, AND
FOR PAIN WITH SCHEDULED METHADONE. NO C/O SOB. VITAL SIGNS STABLE. TELE SHOWED
AFIB IN THE 110S. NO OTHER ACUTE CHANGES IN PT CONDITION NOTED DURING THE
NIGHT. WILL CONTINUE TO MONITOR AND TREAT PER EMAR UNTIL HAND OFF TO DAY SHIFT
RN.

## 2021-08-15 NOTE — NUR
SHIFT SUMMARY-
PT. A&OX2, COMPLAINED DURING THE NIGHT OF IVANNA FOOT AND STOMACH PAIN. MEDICATED
PER EMAR WITH MINIMAL EFFECT. PT. MOANS OFTEN IN SLEEP DURING THE NIGHT, NO
APPARENT DISTRESS NOTED. LOW GRADE TEMP LAST NIGHT, TYLENOL GIVEN PER ORDER.
REPEAT TEMP WNL. DENIED ANY OTHER NEEDS, VSS. CALL LIGHT WITHIN REACH, SIDE
RAILS UPX2, AND BED ALARM ON FOR SAFETY. WILL CONT TO MONITOR.

## 2021-08-15 NOTE — NUR
PT DENIES HEADACHE, CHEST PAIN, OR SOB.  PT REPORTS NEUROPATHY TO BILATERAL
FEET.  PT REPORTS URINATING WITHOUT DIFFICULTY, AND REPORTS HAVING A BM TODAY.
PT'S TELE RHYTHM AFIB, HEART RATE 'S.  PT HAS A HEART MURMUR.  FLUIDS
AT BEDSIDE.  PT ASKING FOR ROOT BEER FLOAT - CNA PROVIDED.  CALL LIGHT WITHIN
REACH.  BED IN LOW POSITION.

## 2021-08-15 NOTE — NUR
SHIFT SUMMARY
 
PT IS AOX4. PT MEDICATED FOR PAIN X1 AND X1 FOR GI UPSET. PT MEDICATED X1 FOR
FEVER. PT 1 ASSIST IN ROOM. PT DENIES SOB, N/V. PT TELE AFIB 110S. PT
APPETITE IS MODERATE. PLAN IS TO MONITOR FOR INFECTION. PT DID NOT HAVE
VISITORS THIS SHIFT. PT IS IN BED, CALL LIGHT IN REACH, BED IN LOW POSITION.

## 2021-08-16 NOTE — NUR
PRODUCTIVE COUGH OF THICK PINK TINGED SPUTUM. PT STATES,"DIFFICULT TO COUGH
UP" PLEASANT T/O DAY WITH NO ACUTE CHANGES.

## 2021-08-16 NOTE — NUR
SHIFT SUMMARY - PT COMPLAINING OF EXTREME NEUROPATHY PAIN TO HER FEET -
MEDICATED WITH TYLENOL AND THEN LYRICA.  PT REPORTS LYRICA HAS WORKED WELL FOR
HER NEUROPATHY PAIN.  PT WAS ALSO REQUESTING METHADONE, BUT APPARENTLY IT WAS
DC'D BECAUSE IT INTERACTS WITH PT PO ANTIBIOTIC, PER . NOTE - I RELAYED THIS
INFORMATION TO PT.  OTHERWISE, NO ACUTE CHANGES THROUGHOUT THIS SHIFT.  PT
SLEPT FOR SEVERAL HOURS TONIGHT.  FLUIDS AT BEDSIDE.  CALL LIGHT WITHIN REACH.
BED IN LOW POSITION.

## 2021-08-16 NOTE — NUR
RESTING COMFORTABLY AT THIS TIME. PT EXPRESSING HER NEED FOR METHADONE. "I
DON'T WANT TO USE HEROIN." METHADONE WAS DISCONTINUED. PT STATES,"WITH
METHADONE I FELT SO MUCH BETTER." LYRICA WAS STARTED FOR COMPLAINTS OF
NEUROPATHY.

## 2021-08-17 NOTE — NUR
SHIFT SUMMARY
AOX4. REPORTS PAIN T/O BODY, MOSTLY IN "STOMACH" & BILAT FEET, GAVE TYLENOL &
SCHEDULED LYRICA-PT STATED NO RELIEF. REPORTED NAUSEA, MEDICATED c ZOFRAN &
MAAYLOX. ANXIOUS. LABILE. TEARFUL @TIMES. STATING SHE DOESNT WANT TO DO HEROIN
BUT PT TRIED TO LEAVE AMA LAST NIGHT BECAUSE SHE WANTED "HEROIN OR OPIOIDS"
FOR HER PAIN. INFORMED CHARGE NURSE & SHE CALLED MD, HE ORDERED 2MG IV ATIVAN.
PT HAS BEEN RESTING COMFORTABLY SINCE. VSS. TELE NSR @88. CALL LIGHT IN REACH.

## 2025-01-23 NOTE — NUR
Pt was taken down to mri via wheelchair, was two person assist to tx to chair,
she arrived to room 342 via wheelchair, a/ox4, but very forgetful, pleasant
and cooperative with care, follows commands well, states she doesn't feel
well, and can't walk, her legs wont mind her, lungs are clear t/o, resp even
and unlabored, no cough noted, on r/a, hrirr, tele on shows afib, no edema
noted, ppp+2, cap refill <4 sec, vs htn, afebrile, piv to rfa, site is clear
and patent, btx4, abd flat soft nontender, voids without diff, skin c/w/d,
marta brown, oriented to room layout and call system, call light in reach.

## 2025-01-23 NOTE — NUR
pt has increased confusion the end of this shift. she is impulsive, got
herself to bsc without calling, calls for her mom, no further changes this
shift. call light in reach.

## 2025-01-24 NOTE — NUR
SHIFT SUMMARY
PATIENT HALLUCINATING AUDITORY/VISUAL AT TIMES. VERY ANXIOUS WITH RESTLESS
LEGS AND LEG PAIN. DR FOWLER ORDERED REQUIP 0.25 MG FOR RESTLESS LEGS. NEW
PIV PLACED AND IV ABX INFUSED. IV FENTANYL 50 MCG x 2 PER EMAR FOR LEG PAIN.
DENIES CHEST PAIN & SOB. NAUSEOUS X ONE AND IV ZOFRAN GIVEN.  VSS/AFEBRILE.
TELE MONITOR AFIB 101. IMPULSIVE AND UNSTEADY WITH ONE ASSIST TO BSC, BED
ALARM ON. CALL LIGHT IN REACH. WILL CONTINUE TO MONITOR UNTIL DAY SHIFT NURSE
ASSUMES CARE.

## 2025-01-24 NOTE — NUR
pt is very impulsive but redirectable, states she can't remember anything,
instructed her to use call light prior to getting oob but doesn't, bed alarm
alway engaged, a/ox3, forgetful, cooperative but at times can be volitile and
use foul language, lungs are clear t/o, resp even and unlabored, no cough
noted, on r/a, hrirr, pulls lines off regularly, no edema noted, ppp+1, cap
reifll <3 sec, vs stable, afebrile, piv to left wrist area, site is clear and
patent btx4, abd flat soft nontender, voids without diff, skin c/w/d, maew,
marta, call light in reach.

## 2025-01-25 NOTE — NUR
SUMMARY- PT A/O X3-4. 1:1 SITTER FOR IMPULSIVITY. AMBULATES TO BATHROOM SBA,
VERY POOR GAIT, ADQ STRENGTH. TOLERATING FOOD AND FLUID. FREQ NAPPING.
MEDICATED WITH ATIVAN X2 THIS SHIFT FOR ANXIETY. MED WITH FENT X2 FOR CHRONIC
LEG PAIN WITH RELEIF. MED WITH CLONODINE X2 FOR PRN BP. PT'S LUNGS CLEAR. TELE
A FIB . STARTED TRIAMCIN. CR FOR RASH OVER BODY THAT DR CHICAS VISUALIZED
THIS AM. WILL REPORT TO NOC RN

## 2025-01-25 NOTE — NUR
CALLED DR CHICAS 1530, NOTIFIED HIM THAT PT REPORTEDLY FROM [facility] SPOKE ABOUT
THOUGHTS OF SUICIDE. RN ASSESSED AND PT STATES SHE WOULD NEVER KILL HERSELF,
SHE DOESN'T HAVE A PLAN. SHE STATES "I JUST DONT WANT TO BE HERE". PT STATES
SHE WANTS TO LEAVE AND GO HAVE A CIGARETTE. PT ALSO STATES SHE IS DETOXING
FROM FENT (SMOKES), AND METH ALTHOUGH FENT NEGATIVE ON TOX SCREEN. PT
CONT TO HAVE SEVERE LEG PAIN DESPITE PAIN REGIMIN. ORDER FOR PT TO BE ON LOW
SI. NICOTINE PATCH ORDERED AND OXY ADDED FOR PAIN. WILL ZOË

## 2025-01-25 NOTE — NUR
SHIFT SUMMARY
 
PT ALERT AND ORIENTED TIMES 3. PT ADMITTED FOR ENCEPHALOPATHY. POSITIVE FOR
METH USE. PT HAS HX OF MRSA, SEPTIC EMBOLISM, PERICARDITIS, A -FIB, HTN,
HEP-C, COPD, CHRONIC LEG PAIN. 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 PT HIGH
FALL RISK, WILL WITHOUT NOTICE LOOSE MUSCLE CONTROL AND COLLAPSE WHILE SITTING
UP OR STANDING. PT HAD IV PLACED IN RIGHT UPPER ARM, FLUSHED WELL. BED  IN LOW
POSITION, CALL LIGHT WITHIN REACH, RAILS TIMES 2.

## 2025-01-26 NOTE — NUR
A&O X4, DROWSY T/O SHIFT, COOPERATIVE W/CARES, MEDICATED PER MAR FOR C/O BL
LEG PAIN, UP TO BR SEVERAL TIMES WITH 1P ASSIST AND FWW, GOOD PO INTAKE,
SLEEPING AT THIS TIME, CALL LIGHT IN REACH, SITTER AT BEDSIDE, WILL CONT TO
MONITOR UNTIL REPORT GIVEN TO ONCOMING NURSE.

## 2025-01-26 NOTE — NUR
SUMMARY- PT A/OX3-4, VERBAL AND INTERACTIVE WITH STAFF. PT HAS A LABILE MOOD
AND BECOMES RESTLESS AND ANXIOUS EASILY. PT IS DIRECTABLE NOW AND WE WERE ABLE
TO DC 1:1 SITTER. USING BED ALARM, PT HAS BEEN USING THE CALL LIGHT. SOMETIMES
SETS OFF BED ALARM AND WONT WAIT FOR RN, BUT STAFF ABLE TO GET TO HER TO
AMBULATE SBA FOR SAFETY, PT HAS ADQ STRENGTH AND STEADY GAIT WITH OCC
MIS-STEP. GAIT MUCH IMPROVED FROM YESTERDAY. PT C/O CHRONIC LEG PAIN. ALSO
COMPLAINED OF ABD DISCOMFORT, EPIGASTRIC TIGHTNESS. PAIN CONTROLLED
ALTERNATING FENT IV/OXY PO. PRN ATIVAN AIDS IN ANXIETY. REOPRTED TO JACOB GELLER RN

## 2025-01-27 NOTE — NUR
pt had a pretty uneventful day today, she ambulated out in singleton with a walker
quite a distance, gait noted to be steady, she complained of stomach pain, Dr. Pinzon ordered maalox for her, this was given, she didn't feel it made much
difference. no further changes this shift, call light in reach.

## 2025-01-27 NOTE — NUR
pt sat up in chair for lunch, asked for assist back to bed, got herself in
bed. call light in reach.

## 2025-01-27 NOTE — NUR
Spirtual care visit conducted.
 
Pt is awake and alert but displayed a slowed manner of speaking. Pt appears
lethargic. Pt has suffered a stroke and took a significant fall. Pt describes
nausea as a result of the stroke and feels that this might be an unusual
symptom. Spiritual assessment conducted. Pt used to attend [facility]
in [place] but has fallen away from the marline. Pt maintains a connection to
god and thinks that those who live without marline likely have a darker outlook
on life. Life review conducted. Pt's father and sister committed suiciide.
Provided compassionate listening and examined the theological implications of
suicide and whether or not this prevents admittance into [facility]. Pt also
describes dissonance between sister "She's jealous of me" Pt's mother
expressed to pt that she was the "favorite" in her last moments and this only
deepened the conflict. Pt reports being sober and suggests that drug use in
others is linked to bipolar disorder. Pt has a close relationship with her
daughter but expresses disappointment about not being able to live with her
because of potential familial conflict. Pt takes great pride in her daughter's
career path and affluent life. Pt requested a bible. Bible was delivered.
Prayer was offered. Pt was receptive to prayer and thankful for the visit:
"Thank you for going around and ministering", "feel free to stop by again."
Although pt began to complain of nausea, Pt appears comforted and uplifted by
visit.
 
linked

## 2025-01-27 NOTE — NUR
SHIFT SUMMARY
69 YR F ADMITTED ON 01/24/25. FULL CODE. PT IS IMPULSIVE TO GET OUT OF BED W/O
CALLING FOR ASSISTANCE, EVEN W/ MANY REMINDERS. SHE IS UNSTEADY ON HER FEET
AND NEARLY FELL OFF THE TOILET SO IT IS NOT SAFE FOR HER TO AMBULATE
INDEPENDANTLY. BED ALARM IS ON FOR SAFETY. BP /109 @ 0230 AND ORDER WAS
OBTAINED FOR ONE TIME DOSE OF HYDRALIZINE. PT C/O BURNING PAIN IN HER FEET AND
SPECIFICALLY ASKED FOR FENTANYL. SHE WAS GIVEN OXY FOR PAIN PRIOR TO THAT.
WILL CONTINUE TO MONITOR. BED IN LOW POSITION WITH ALARM ON, AND CALL LIGHT IN
REACH.

## 2025-01-27 NOTE — NUR
pt laying in bed, impulsive, doesn't follow directions, bed alarm acitvated,
a/ox2-3, very forgetful, lungs are clear in upper fields, dim in bases, resp
even and unlabored, no cough noted, hirr, tele in place running afib per
monitor, see strip, no edema noted, ppp+2, cap refill <3 sec, vs stable,
afebrile, piv to agatha site is clear and patent, btx4, abd flat soft nontender,
voids without diff, skin c/w/d, maew, marta, call light in reach.

## 2025-01-28 NOTE — NUR
SHIFT SUMMARY
69 YR F ADMITTED ON 01/24/25. FULL CODE. NO ACUTE CHANGES THIS SHIFT. PT
AMBULATED IN THE HALLWAY SEVERAL TIMES THIS SHIFT. SHE IS STEADY ON HER FEET
W/ FWW. PT C/O STOMACH ACHE AND BURNING EVEN AFTER RECEIVING MAALOX EARLIER IN
THE DAY. ORDER WAS OBTAINED FOR GI COCKTAIL AND PT STATED THAT IT WORKED VERY
WELL. PT ALSO C/O BURNING PAIN IN HER LEGS AND WAS MEDICATE PER EMAR W/ GOOD
RESULTS. PT WAS ABLE TO SLEEP FOR SEVERAL HOURS AT A TIME THIS SHIFT. NO OTHER
C/O PAIN OR DISCOMFORT THIS SHIFT. WILL CONTINUE TO MONITOR. BED IN LOW
POSITION AND CALL LIGHT IN REACH.

## 2025-01-28 NOTE — NUR
SHIFT SUMMARY
 
PT AOX3-4, INTERMITTANT CONFUSION.  1 ASSIST WITH THE FWW TO THE BR.
MEDICATED FOR NAUSEA PER THE EMAR.  PT HAS NOT HAD A BM FOR TWO DAYS, NEW
BOWEL CARE MEDS ORDERED PER THE EMAR.  PT CALLS AT TIMES, SOMETIMES JUST YELLS
OUT.  BA AND CA ON WHEN APPLICABLE.  PT REPOSITIONS SELF IN BED.  NO EVENTS
PER TELE.  PLAN IS TO POSSIBLY DC TOMORROW.  FAMILY UPDATED THIS SHIFT.  CALL
LIGHT WITHIN REACH, BED LOCKED AND IN THE LOWEST POSITION.  WILL REPORT TO
ONCOMING NURSE.

## 2025-01-29 NOTE — NUR
SHIFT SUMMARY
69 YR F ADMITTED ON 01/24/25. FULL CODE. NO ACUTE CHANGES THIS SHIFT. PT
PULLED HER IV OUT EARLY IN THE SHIFT AND ALSO REMOVED HER TELE. SINCE
DISCHARGE IS PLANNED FOR TOMORROW, ORDER WAS OBTAINED FOR DC OF BOTH. NO OTHER
CHANGES TO REPORT. WILL CONTINUE TO MONITOR. BED IN LOW POSITION AND CALL
LIGHT IN REACH.

## 2025-01-29 NOTE — NUR
DISCHARGE NOTE
 
PT DISCHARGED TO HOME, PICKED UP BY HER SISTER-IN-LAW.  TAKEN TO HER VEHICLED
BY WC.  IV REMOVED, TELE RETURNED.  DISCHARGE INFORMATION AND EDUCATION
PROVIDED.  MEDICATIONS FAXED TO THE PHARMACY OF HER CHOICE.  HARD SCRIPT
PROVIDED.  PERSONAL BELONGINGS RETURNED.